# Patient Record
Sex: FEMALE | Race: WHITE | NOT HISPANIC OR LATINO | Employment: FULL TIME | URBAN - METROPOLITAN AREA
[De-identification: names, ages, dates, MRNs, and addresses within clinical notes are randomized per-mention and may not be internally consistent; named-entity substitution may affect disease eponyms.]

---

## 2017-01-30 ENCOUNTER — HOSPITAL ENCOUNTER (INPATIENT)
Facility: HOSPITAL | Age: 52
LOS: 7 days | Discharge: HOME | End: 2017-02-06
Attending: SPECIALIST | Admitting: SPECIALIST

## 2017-01-30 ENCOUNTER — HOSPITAL ENCOUNTER (EMERGENCY)
Facility: HOSPITAL | Age: 52
Discharge: PSYCHIATRIC OR SUBSTANCE USE FACILITY | End: 2017-01-30
Attending: EMERGENCY MEDICINE | Admitting: EMERGENCY MEDICINE

## 2017-01-30 VITALS
BODY MASS INDEX: 22.82 KG/M2 | WEIGHT: 124 LBS | SYSTOLIC BLOOD PRESSURE: 155 MMHG | HEIGHT: 62 IN | HEART RATE: 95 BPM | OXYGEN SATURATION: 100 % | RESPIRATION RATE: 18 BRPM | TEMPERATURE: 97.2 F | DIASTOLIC BLOOD PRESSURE: 100 MMHG

## 2017-01-30 DIAGNOSIS — F30.10 MANIC BEHAVIOR (HCC): Primary | ICD-10-CM

## 2017-01-30 DIAGNOSIS — Z86.59 HISTORY OF BIPOLAR DISORDER: ICD-10-CM

## 2017-01-30 PROBLEM — F45.41 STRESS HEADACHE: Status: ACTIVE | Noted: 2017-01-30

## 2017-01-30 PROBLEM — F31.9 BIPOLAR 1 DISORDER (HCC): Status: ACTIVE | Noted: 2017-01-30

## 2017-01-30 PROBLEM — R00.2 PALPITATIONS: Status: ACTIVE | Noted: 2017-01-30

## 2017-01-30 PROBLEM — F32.A DEPRESSION: Status: ACTIVE | Noted: 2017-01-30

## 2017-01-30 PROBLEM — G47.00 INSOMNIA: Status: ACTIVE | Noted: 2017-01-30

## 2017-01-30 LAB
ALBUMIN SERPL-MCNC: 4.7 G/DL (ref 3.5–5.2)
ALBUMIN/GLOB SERPL: 1.7 G/DL
ALP SERPL-CCNC: 47 U/L (ref 39–117)
ALT SERPL W P-5'-P-CCNC: 11 U/L (ref 1–33)
AMPHET+METHAMPHET UR QL: NEGATIVE
ANION GAP SERPL CALCULATED.3IONS-SCNC: 13.4 MMOL/L
APAP SERPL-MCNC: <5 MCG/ML (ref 10–30)
AST SERPL-CCNC: 17 U/L (ref 1–32)
BARBITURATES UR QL SCN: NEGATIVE
BASOPHILS # BLD AUTO: 0.01 10*3/MM3 (ref 0–0.2)
BASOPHILS NFR BLD AUTO: 0.2 % (ref 0–1.5)
BENZODIAZ UR QL SCN: POSITIVE
BILIRUB SERPL-MCNC: 0.3 MG/DL (ref 0.1–1.2)
BILIRUB UR QL STRIP: NEGATIVE
BUN BLD-MCNC: 10 MG/DL (ref 6–20)
BUN/CREAT SERPL: 15.9 (ref 7–25)
CALCIUM SPEC-SCNC: 9.5 MG/DL (ref 8.6–10.5)
CANNABINOIDS SERPL QL: NEGATIVE
CHLORIDE SERPL-SCNC: 102 MMOL/L (ref 98–107)
CLARITY UR: ABNORMAL
CO2 SERPL-SCNC: 25.6 MMOL/L (ref 22–29)
COCAINE UR QL: NEGATIVE
COLOR UR: YELLOW
CREAT BLD-MCNC: 0.63 MG/DL (ref 0.57–1)
DEPRECATED RDW RBC AUTO: 47.1 FL (ref 37–54)
EOSINOPHIL # BLD AUTO: 0.01 10*3/MM3 (ref 0–0.7)
EOSINOPHIL NFR BLD AUTO: 0.2 % (ref 0.3–6.2)
ERYTHROCYTE [DISTWIDTH] IN BLOOD BY AUTOMATED COUNT: 13.3 % (ref 11.7–13)
ETHANOL BLD-MCNC: <10 MG/DL (ref 0–10)
ETHANOL UR QL: <0.01 %
GFR SERPL CREATININE-BSD FRML MDRD: 99 ML/MIN/1.73
GLOBULIN UR ELPH-MCNC: 2.7 GM/DL
GLUCOSE BLD-MCNC: 116 MG/DL (ref 65–99)
GLUCOSE UR STRIP-MCNC: NEGATIVE MG/DL
HCT VFR BLD AUTO: 43.6 % (ref 35.6–45.5)
HGB BLD-MCNC: 14.7 G/DL (ref 11.9–15.5)
HGB UR QL STRIP.AUTO: NEGATIVE
HOLD SPECIMEN: NORMAL
HOLD SPECIMEN: NORMAL
IMM GRANULOCYTES # BLD: 0 10*3/MM3 (ref 0–0.03)
IMM GRANULOCYTES NFR BLD: 0 % (ref 0–0.5)
KETONES UR QL STRIP: NEGATIVE
LEUKOCYTE ESTERASE UR QL STRIP.AUTO: NEGATIVE
LYMPHOCYTES # BLD AUTO: 1.36 10*3/MM3 (ref 0.9–4.8)
LYMPHOCYTES NFR BLD AUTO: 22.8 % (ref 19.6–45.3)
MCH RBC QN AUTO: 32.9 PG (ref 26.9–32)
MCHC RBC AUTO-ENTMCNC: 33.7 G/DL (ref 32.4–36.3)
MCV RBC AUTO: 97.5 FL (ref 80.5–98.2)
METHADONE UR QL SCN: NEGATIVE
MONOCYTES # BLD AUTO: 0.27 10*3/MM3 (ref 0.2–1.2)
MONOCYTES NFR BLD AUTO: 4.5 % (ref 5–12)
NEUTROPHILS # BLD AUTO: 4.32 10*3/MM3 (ref 1.9–8.1)
NEUTROPHILS NFR BLD AUTO: 72.3 % (ref 42.7–76)
NITRITE UR QL STRIP: NEGATIVE
OPIATES UR QL: NEGATIVE
OXYCODONE UR QL SCN: NEGATIVE
PH UR STRIP.AUTO: 6.5 [PH] (ref 5–8)
PLATELET # BLD AUTO: 247 10*3/MM3 (ref 140–500)
PMV BLD AUTO: 9.5 FL (ref 6–12)
POTASSIUM BLD-SCNC: 3.6 MMOL/L (ref 3.5–5.2)
PROT SERPL-MCNC: 7.4 G/DL (ref 6–8.5)
PROT UR QL STRIP: NEGATIVE
RBC # BLD AUTO: 4.47 10*6/MM3 (ref 3.9–5.2)
SALICYLATES SERPL-MCNC: <0.3 MG/DL
SODIUM BLD-SCNC: 141 MMOL/L (ref 136–145)
SP GR UR STRIP: 1.02 (ref 1–1.03)
T4 SERPL-MCNC: 7.89 MCG/DL (ref 4.5–11.7)
TSH SERPL DL<=0.05 MIU/L-ACNC: 1.26 MIU/ML (ref 0.27–4.2)
UROBILINOGEN UR QL STRIP: ABNORMAL
WBC NRBC COR # BLD: 5.97 10*3/MM3 (ref 4.5–10.7)
WHOLE BLOOD HOLD SPECIMEN: NORMAL
WHOLE BLOOD HOLD SPECIMEN: NORMAL

## 2017-01-30 PROCEDURE — 93005 ELECTROCARDIOGRAM TRACING: CPT | Performed by: INTERNAL MEDICINE

## 2017-01-30 PROCEDURE — 93010 ELECTROCARDIOGRAM REPORT: CPT | Performed by: INTERNAL MEDICINE

## 2017-01-30 PROCEDURE — 81003 URINALYSIS AUTO W/O SCOPE: CPT | Performed by: SPECIALIST

## 2017-01-30 RX ORDER — CLONAZEPAM 0.5 MG/1
0.5 TABLET ORAL AS NEEDED
Status: DISCONTINUED | OUTPATIENT
Start: 2017-01-30 | End: 2017-02-06 | Stop reason: HOSPADM

## 2017-01-30 RX ORDER — BUPROPION HYDROCHLORIDE 100 MG/1
100 TABLET ORAL DAILY
Status: DISCONTINUED | OUTPATIENT
Start: 2017-01-30 | End: 2017-01-31

## 2017-01-30 RX ORDER — QUETIAPINE FUMARATE 50 MG/1
50 TABLET, FILM COATED ORAL NIGHTLY
COMMUNITY
End: 2017-02-06 | Stop reason: HOSPADM

## 2017-01-30 RX ORDER — MAGNESIUM HYDROXIDE/ALUMINUM HYDROXICE/SIMETHICONE 120; 1200; 1200 MG/30ML; MG/30ML; MG/30ML
30 SUSPENSION ORAL EVERY 6 HOURS PRN
Status: DISCONTINUED | OUTPATIENT
Start: 2017-01-30 | End: 2017-02-06 | Stop reason: HOSPADM

## 2017-01-30 RX ORDER — CLONAZEPAM 0.5 MG/1
0.5 TABLET ORAL NIGHTLY
Status: COMPLETED | OUTPATIENT
Start: 2017-01-30 | End: 2017-01-30

## 2017-01-30 RX ORDER — CLONAZEPAM 1 MG/1
1.5 TABLET ORAL NIGHTLY
COMMUNITY

## 2017-01-30 RX ORDER — TERBINAFINE HYDROCHLORIDE 250 MG/1
250 TABLET ORAL
Status: COMPLETED | OUTPATIENT
Start: 2017-01-30 | End: 2017-01-30

## 2017-01-30 RX ORDER — BUPROPION HYDROCHLORIDE 100 MG/1
200 TABLET ORAL DAILY
COMMUNITY
End: 2017-02-06 | Stop reason: HOSPADM

## 2017-01-30 RX ORDER — TERBINAFINE HYDROCHLORIDE 250 MG/1
250 TABLET ORAL DAILY
COMMUNITY

## 2017-01-30 RX ORDER — BUPROPION HYDROCHLORIDE 300 MG/1
300 TABLET ORAL DAILY
Status: CANCELLED | OUTPATIENT
Start: 2017-01-30 | End: 2017-02-01

## 2017-01-30 RX ORDER — LORAZEPAM 1 MG/1
1 TABLET ORAL EVERY 6 HOURS PRN
Status: DISCONTINUED | OUTPATIENT
Start: 2017-01-30 | End: 2017-01-30 | Stop reason: HOSPADM

## 2017-01-30 RX ORDER — QUETIAPINE FUMARATE 50 MG/1
50 TABLET, EXTENDED RELEASE ORAL NIGHTLY
Status: COMPLETED | OUTPATIENT
Start: 2017-01-30 | End: 2017-01-30

## 2017-01-30 RX ORDER — LAMOTRIGINE 100 MG/1
300 TABLET ORAL DAILY
COMMUNITY
End: 2017-02-06 | Stop reason: HOSPADM

## 2017-01-30 RX ORDER — ACETAMINOPHEN 325 MG/1
650 TABLET ORAL EVERY 6 HOURS PRN
Status: DISCONTINUED | OUTPATIENT
Start: 2017-01-30 | End: 2017-02-06 | Stop reason: HOSPADM

## 2017-01-30 RX ADMIN — LORAZEPAM 1 MG: 1 TABLET ORAL at 12:51

## 2017-01-30 RX ADMIN — TERBINAFINE 250 MG: 250 TABLET ORAL at 19:18

## 2017-01-30 RX ADMIN — ACETAMINOPHEN 650 MG: 325 TABLET ORAL at 18:15

## 2017-01-30 RX ADMIN — CLONAZEPAM 0.5 MG: 0.5 TABLET ORAL at 22:42

## 2017-01-30 RX ADMIN — CLONAZEPAM 0.5 MG: 0.5 TABLET ORAL at 17:27

## 2017-01-30 RX ADMIN — QUETIAPINE 50 MG: 50 TABLET, EXTENDED RELEASE ORAL at 22:41

## 2017-01-31 RX ORDER — TERBINAFINE HYDROCHLORIDE 250 MG/1
250 TABLET ORAL DAILY
Status: DISCONTINUED | OUTPATIENT
Start: 2017-01-31 | End: 2017-02-06 | Stop reason: HOSPADM

## 2017-01-31 RX ORDER — QUETIAPINE FUMARATE 50 MG/1
50 TABLET, FILM COATED ORAL NIGHTLY
Status: DISCONTINUED | OUTPATIENT
Start: 2017-01-31 | End: 2017-01-31

## 2017-01-31 RX ORDER — LAMOTRIGINE 100 MG/1
300 TABLET ORAL DAILY
Status: DISCONTINUED | OUTPATIENT
Start: 2017-01-31 | End: 2017-02-06 | Stop reason: HOSPADM

## 2017-01-31 RX ORDER — ZOLPIDEM TARTRATE 5 MG/1
5 TABLET ORAL NIGHTLY PRN
Status: DISCONTINUED | OUTPATIENT
Start: 2017-01-31 | End: 2017-02-06 | Stop reason: HOSPADM

## 2017-01-31 RX ORDER — LURASIDONE HYDROCHLORIDE 20 MG/1
20 TABLET, FILM COATED ORAL
Status: DISCONTINUED | OUTPATIENT
Start: 2017-01-31 | End: 2017-02-01

## 2017-01-31 RX ADMIN — LAMOTRIGINE 300 MG: 100 TABLET ORAL at 12:12

## 2017-01-31 RX ADMIN — LURASIDONE HYDROCHLORIDE 20 MG: 20 TABLET, FILM COATED ORAL at 18:48

## 2017-01-31 RX ADMIN — CLONAZEPAM 1.5 MG: 0.5 TABLET ORAL at 20:55

## 2017-01-31 RX ADMIN — ACETAMINOPHEN 650 MG: 325 TABLET ORAL at 14:33

## 2017-01-31 RX ADMIN — TERBINAFINE HYDROCHLORIDE 250 MG: 250 TABLET ORAL at 12:12

## 2017-02-01 PROCEDURE — 25010000002 HALOPERIDOL LACTATE PER 5 MG: Performed by: SPECIALIST

## 2017-02-01 PROCEDURE — 25010000002 DIPHENHYDRAMINE PER 50 MG: Performed by: SPECIALIST

## 2017-02-01 PROCEDURE — 25010000002 LORAZEPAM PER 2 MG: Performed by: SPECIALIST

## 2017-02-01 RX ORDER — OLANZAPINE 10 MG/1
10 TABLET, ORALLY DISINTEGRATING ORAL ONCE
Status: DISCONTINUED | OUTPATIENT
Start: 2017-02-01 | End: 2017-02-01

## 2017-02-01 RX ORDER — OLANZAPINE 7.5 MG/1
15 TABLET ORAL NIGHTLY
Status: DISCONTINUED | OUTPATIENT
Start: 2017-02-01 | End: 2017-02-06 | Stop reason: HOSPADM

## 2017-02-01 RX ORDER — HALOPERIDOL 5 MG/ML
10 INJECTION INTRAMUSCULAR ONCE
Status: COMPLETED | OUTPATIENT
Start: 2017-02-01 | End: 2017-02-01

## 2017-02-01 RX ORDER — LORAZEPAM 2 MG/ML
2 INJECTION INTRAMUSCULAR ONCE
Status: COMPLETED | OUTPATIENT
Start: 2017-02-01 | End: 2017-02-01

## 2017-02-01 RX ORDER — OLANZAPINE 10 MG/1
10 INJECTION, POWDER, LYOPHILIZED, FOR SOLUTION INTRAMUSCULAR ONCE
Status: DISCONTINUED | OUTPATIENT
Start: 2017-02-01 | End: 2017-02-06 | Stop reason: HOSPADM

## 2017-02-01 RX ORDER — NICOTINE 21 MG/24HR
1 PATCH, TRANSDERMAL 24 HOURS TRANSDERMAL EVERY 24 HOURS
Status: DISCONTINUED | OUTPATIENT
Start: 2017-02-01 | End: 2017-02-06 | Stop reason: HOSPADM

## 2017-02-01 RX ORDER — OLANZAPINE 5 MG/1
5 TABLET, ORALLY DISINTEGRATING ORAL ONCE
Status: COMPLETED | OUTPATIENT
Start: 2017-02-01 | End: 2017-02-01

## 2017-02-01 RX ORDER — DIPHENHYDRAMINE HYDROCHLORIDE 50 MG/ML
50 INJECTION INTRAMUSCULAR; INTRAVENOUS ONCE
Status: COMPLETED | OUTPATIENT
Start: 2017-02-01 | End: 2017-02-01

## 2017-02-01 RX ADMIN — TERBINAFINE HYDROCHLORIDE 250 MG: 250 TABLET ORAL at 08:02

## 2017-02-01 RX ADMIN — LAMOTRIGINE 300 MG: 100 TABLET ORAL at 08:02

## 2017-02-01 RX ADMIN — HALOPERIDOL LACTATE 10 MG: 5 INJECTION, SOLUTION INTRAMUSCULAR at 11:32

## 2017-02-01 RX ADMIN — CLONAZEPAM 0.5 MG: 0.5 TABLET ORAL at 10:42

## 2017-02-01 RX ADMIN — OLANZAPINE 15 MG: 7.5 TABLET, FILM COATED ORAL at 21:27

## 2017-02-01 RX ADMIN — OLANZAPINE 10 MG: 5 TABLET, ORALLY DISINTEGRATING ORAL at 09:50

## 2017-02-01 RX ADMIN — DIPHENHYDRAMINE HYDROCHLORIDE 50 MG: 50 INJECTION INTRAMUSCULAR; INTRAVENOUS at 11:32

## 2017-02-01 RX ADMIN — CLONAZEPAM 1.5 MG: 0.5 TABLET ORAL at 21:27

## 2017-02-01 RX ADMIN — LORAZEPAM 2 MG: 2 INJECTION, SOLUTION INTRAMUSCULAR; INTRAVENOUS at 11:33

## 2017-02-01 NOTE — PROGRESS NOTES
"Therapist met with pt's spouse outside of unit to notify him that family session has been canceled for this morning due to pt's inability to participate.  Spouse became tearful and insistent that he be allowed onto unit to visit with pt \"because I promised her I was coming at 11am.\"  This therapist as well as RN explained to pt that he can return to visit with pt during normal visiting hours, but pt is not currently stable enough to have a session.  Spouse became very tearful and irritated stating \"this isn't fair, I promised her.\"  Therapist sat with spouse and provided emotional support, discussed self care and encouraged spouse to reach out to family or friends for support at this time. Spouse states he will return for visitation this evening.   "

## 2017-02-01 NOTE — NURSING NOTE
Patient calm and resting in bed at this time. VS taken. All within adult suggested parameters as documented.

## 2017-02-01 NOTE — PLAN OF CARE
Problem:  Patient Care Overview (Adult)  Goal: Discharge Needs Assessment  Outcome: Ongoing (interventions implemented as appropriate)    02/01/17 1425   Discharge Needs Assessment   Concerns To Be Addressed mental health concerns;substance/tobacco abuse/use concerns   Current Discharge Risk history of non-[facility];psychiatric illness   Discharge Planning Comments Pt current with Dr. Garcia. ANDIE consult pending. SW to encourage pt to follow up with therapy upon discharge.    Discharge Needs Assessment   Outpatient/Agency/Support Group Needs outpatient counseling;outpatient psychiatric care (specify)   Anticipated Discharge Disposition home with family;home or self-care

## 2017-02-01 NOTE — NURSING NOTE
Pt sitting at chair in hallways and became tearful. Offered reassurance and active listening. Pt stated that she wanted her PRN medication for anxiety at this time. Medication retrieved. When this nurse came back to administer the medication, the pt had been assisted to her room and was lying in the bed screaming and crying histarically. She was putting her face in the pillows and not responding to questions or directions. Eventually she sat up and took her PRN medication. Reassurance offered, curtains opened and pt encouragged to express thoughts/feelings. She continued crying but turned as if to look out the window.

## 2017-02-01 NOTE — PLAN OF CARE
"Problem:  Patient Care Overview (Adult)  Goal: Plan of Care Review  Outcome: Ongoing (interventions implemented as appropriate)    02/01/17 1022 02/01/17 1500   Patient Care Overview   Progress --  declining   Coping/Psychosocial Response Interventions   Plan Of Care Reviewed With --  patient   Coping/Psychosocial   Patient Agreement with Plan of Care other (see comments)  (repetative questioning. Acts confused, states, \"I understand) --    Outcome Evaluation   Outcome Summary/Follow up Plan --  Patients behavior is declining. Went form paranoia to karen this shift. Required additional medicatins and medicaiton adjustments.          Problem:  Overarching Goals  Goal: Adheres to Safety Considerations for Self and Others  Outcome: Ongoing (interventions implemented as appropriate)    02/01/17 1500   Overarching Goals   Adheres to Safety Considerations patinet unable to comprhend safety measures at this time due to manic state       Intervention: Develop/maintain Individualized Safety Plan    01/31/17 1800 02/01/17 1022   Safety   Safety Measures safety rounds completed --    Provide Emotional/Physical Safety   Suicidal Ideation --  no   Willingness to Contact Staff Member if Feeling Like Hurting Self --  yes   Mental Health Homicide Risk   Homicidal Ideation --  no   Willingness to Contact Staff Member if Feeling Like Hurting Others --  yes         Goal: Optimized Coping Skills in Response to Life Stressors  Outcome: Ongoing (interventions implemented as appropriate)    02/01/17 1500   Overarching Goals   Optimized Coping Skills patient was unable to utilize effective coping skills today due to manic behaviors.        Intervention: Promote Effective Coping Strategies    02/01/17 1022   Coping Strategies   Supportive Measures active listening utilized;journaling promoted;problem solving facilitated;self-care encouraged;self-reflection promoted;self-responsibility promoted;verbalization of feelings encouraged     "     Goal: Develops/Participates in Therapeutic [place] to Support Successful Transition  Outcome: Ongoing (interventions implemented as appropriate)    02/01/17 1500   Patient Care Overview   Develop/Participate Therapeutic [place] Patient was unable to participate in support groups today due to manic state.        Intervention: Foster Therapeutic [place]    02/01/17 1022   Coping Strategies   Trust Relationship/Rapport care explained;choices provided;emotional support provided;empathic listening provided;questions answered;questions encouraged;reassurance provided;thoughts/feelings acknowledged       Intervention: Mutually Develop Transition Plan    02/01/17 1022   OTHER   Transition Support crisis management plan promoted

## 2017-02-01 NOTE — NURSING NOTE
Doctor here to see patient with new orders to give a one time dose of 10 mg disintegrating Zyprexa. Medication given with explanation for use. Patient accepted the medication after several explanations. Assessed again for anxiety and offered PRN medication but pt declined.

## 2017-02-01 NOTE — PLAN OF CARE
Problem:  Patient Care Overview (Adult)  Goal: Plan of Care Review  Outcome: Ongoing (interventions implemented as appropriate)    01/31/17 2329 02/01/17 0329   Patient Care Overview   Progress --  no change   Coping/Psychosocial Response Interventions   Plan Of Care Reviewed With patient --    Coping/Psychosocial   Patient Agreement with Plan of Care agrees --    Outcome Evaluation   Outcome Summary/Follow up Plan --  Pt has been compliant with medications this shift. She rates anxiety 7/10, depression 5/10, denies pain, SI, HI and hallucinations. Will continue to monitor.         Problem:  Overarching Goals  Goal: Adheres to Safety Considerations for Self and Others  Outcome: Ongoing (interventions implemented as appropriate)  Goal: Optimized Coping Skills in Response to Life Stressors  Outcome: Ongoing (interventions implemented as appropriate)  Goal: Develops/Participates in Therapeutic [place] to Support Successful Transition  Outcome: Ongoing (interventions implemented as appropriate)

## 2017-02-01 NOTE — NURSING NOTE
"Pt with paranoid behavior this morning. Consistently asking repetitive questions an seeking out staff members to get clarifications on medications. Rolling pills in hands, looking at them for wiliam periods and asking what they are and what they are for. After explanation given, she would stare blankly and then ask the same questions again as if she did not comprehend the explanation. Was eventually med compliant after numerous explanations. Pt going to several staff members with frequent statements such as, \"I have a request, that should not be too much to ask\" but then when asked what her request was, she would repeat the same response or offer an explanation of something rather than a request or question. Pt wandering halls frequently. Refused to go to group but then after looking at the calender stated that she wanted to attend; however, she then would walk away to someone else to ask a question and not go. Patient stated that she had anxiety but  Refused her PRN medication when offered.  "

## 2017-02-01 NOTE — PROGRESS NOTES
Cycling into karen -- irritable and paranoid today    Required prn dose of OLZ this AM due to agitation    Will DC LUR and begin aggressively dosed OLZ

## 2017-02-01 NOTE — NURSING NOTE
"Pt continued to scream and cry uncontrollably. She got up several times to walk but as she did, appeared to be bending here knees with an  Unsteady gait. She was not responding to staff when offered instruction. Assisted to bed. Continued screaming and putting hands in hair, stating, \"what the fuck is going on here\" but when attempted to talk to pt, she would just scream and cry. Dr. SANDERSON notified of pt state and gave new orders to administer 10 mg IM Haldol plus 2 mg IM Ativan plus 50 mg IM Benadryl now. Medication retrieved and patient willingly accepted the injections after explanation.   "

## 2017-02-02 RX ADMIN — OLANZAPINE 15 MG: 7.5 TABLET, FILM COATED ORAL at 21:14

## 2017-02-02 RX ADMIN — CLONAZEPAM 1.5 MG: 0.5 TABLET ORAL at 21:14

## 2017-02-02 RX ADMIN — LAMOTRIGINE 300 MG: 100 TABLET ORAL at 08:18

## 2017-02-02 RX ADMIN — TERBINAFINE HYDROCHLORIDE 250 MG: 250 TABLET ORAL at 08:18

## 2017-02-02 NOTE — PLAN OF CARE
Problem: BH Patient Care Overview (Adult)  Goal: Plan of Care Review  Outcome: Ongoing (interventions implemented as appropriate)    02/02/17 1612   Patient Care Overview   Progress improving   Coping/Psychosocial Response Interventions   Plan Of Care Reviewed With spouse   Coping/Psychosocial   Patient Agreement with Plan of Care agrees   Outcome Evaluation   Outcome Summary/Follow up Plan SW received 2 phone calls from pt's  today re: FMLA paperwork for pt's employer. SW provided 2 fax numbers for paperwork to be faxed to and SW did not receive paperwork. SW called and left message for pt's employer requesting the paperwork and leaving contact information if needed.  requested another family session since pt was not appropriate for a session yesterday. SW recommended session for the weekend or possibly Monday. ADDISON will relay this info to therapists who schedule.

## 2017-02-02 NOTE — PLAN OF CARE
Problem: BH Patient Care Overview (Adult)  Goal: Plan of Care Review  Outcome: Ongoing (interventions implemented as appropriate)    02/02/17 1516   Patient Care Overview   Progress improving   Coping/Psychosocial Response Interventions   Plan Of Care Reviewed With patient   Coping/Psychosocial   Patient Agreement with Plan of Care agrees         Problem:  Overarching Goals  Goal: Adheres to Safety Considerations for Self and Others  Outcome: Ongoing (interventions implemented as appropriate)    02/02/17 1516   Overarching Goals   Adheres to Safety Considerations Safety risk factors identified, safety concerns discussed, protective physical environment provided, safety and environmental rounds provided as appropriate       Goal: Optimized Coping Skills in Response to Life Stressors  Outcome: Ongoing (interventions implemented as appropriate)    02/02/17 1516   Overarching Goals   Optimized Coping Skills Coping strategies identified, assisted with using positive coping strategies, healthy lifestyle activities encouraged       Goal: Develops/Participates in Therapeutic [place] to Support Successful Transition  Outcome: Ongoing (interventions implemented as appropriate)    02/02/17 1516   Patient Care Overview   Develop/Participate Therapeutic [place] Rapport established, trust relationship developed, emotional support provided, confidentiality honored

## 2017-02-02 NOTE — PROGRESS NOTES
"Groggy this AM (may need to consider reduction in OLZ dose), but greatly improved from yesterday    continuing aggressive pharmacotx -- suspect we may not yet be \"out of the woods\" re manic syx  "

## 2017-02-02 NOTE — PLAN OF CARE
"Problem:  Patient Care Overview (Adult)  Goal: Plan of Care Review  Outcome: Ongoing (interventions implemented as appropriate)    02/01/17 2132 02/02/17 0337   Coping/Psychosocial Response Interventions   Plan Of Care Reviewed With patient --    Coping/Psychosocial   Patient Agreement with Plan of Care agrees --    Outcome Evaluation   Outcome Summary/Follow up Plan --  Pt remained in room this shift sleeping, up once to the bathroom and to take medications. Pt stated \"Im just so tired.\" Pt denied all issues, denied thoughts to hurt self or others. Will continue to monitor changes in mood and behaviors and provide feedback and support         Problem:  Overarching Goals  Goal: Adheres to Safety Considerations for Self and Others  Intervention: Develop/maintain Individualized Safety Plan    02/01/17 2132   Safety   Safety Measures safety rounds completed;suicide assessment completed   Provide Emotional/Physical Safety   Suicidal Ideation no   Willingness to Contact Staff Member if Feeling Like Hurting Self yes   Mental Health Homicide Risk   Homicidal Ideation no   Willingness to Contact Staff Member if Feeling Like Hurting Others yes         Goal: Optimized Coping Skills in Response to Life Stressors  Intervention: Promote Effective Coping Strategies    02/01/17 2132   Coping Strategies   Supportive Measures active listening utilized;relaxation techniques promoted;self-care encouraged;self-reflection promoted;self-responsibility promoted;verbalization of feelings encouraged         Goal: Develops/Participates in Therapeutic [place] to Support Successful Transition  Intervention: Foster Therapeutic [place]    02/01/17 2132   Coping Strategies   Trust Relationship/Rapport choices provided;care explained;emotional support provided;empathic listening provided;questions answered;questions encouraged;reassurance provided;thoughts/feelings acknowledged       Intervention: Mutually Develop Transition Plan    02/01/17 " [id number]   OTHER   Transition Support crisis management plan promoted

## 2017-02-03 RX ADMIN — CLONAZEPAM 0.5 MG: 0.5 TABLET ORAL at 09:13

## 2017-02-03 RX ADMIN — TERBINAFINE HYDROCHLORIDE 250 MG: 250 TABLET ORAL at 09:11

## 2017-02-03 RX ADMIN — OLANZAPINE 15 MG: 7.5 TABLET, FILM COATED ORAL at 22:00

## 2017-02-03 RX ADMIN — CLONAZEPAM 1.5 MG: 0.5 TABLET ORAL at 22:00

## 2017-02-03 RX ADMIN — LAMOTRIGINE 300 MG: 100 TABLET ORAL at 09:08

## 2017-02-03 NOTE — PLAN OF CARE
Problem:  Patient Care Overview (Adult)  Goal: Plan of Care Review  Outcome: Ongoing (interventions implemented as appropriate)    02/03/17 1836   Patient Care Overview   Progress improving   Coping/Psychosocial Response Interventions   Plan Of Care Reviewed With patient   Coping/Psychosocial   Patient Agreement with Plan of Care agrees         Problem:  Overarching Goals  Goal: Adheres to Safety Considerations for Self and Others  Outcome: Ongoing (interventions implemented as appropriate)    02/03/17 1836   Overarching Goals   Adheres to Safety Considerations Discussed safety concerns. Safe environment provided.       Intervention: Develop/maintain Individualized Safety Plan    02/03/17 0900 02/03/17 1700   Safety   Safety Measures --  safety rounds completed   Provide Emotional/Physical Safety   Suicidal Ideation no --    Willingness to Contact Staff Member if Feeling Like Hurting Self yes --    Mental Health Homicide Risk   Homicidal Ideation no --    Willingness to Contact Staff Member if Feeling Like Hurting Others yes --          Goal: Optimized Coping Skills in Response to Life Stressors  Outcome: Ongoing (interventions implemented as appropriate)    02/03/17 1836   Overarching Goals   Optimized Coping Skills Discussed utilizing coping skills appropriately.       Intervention: Promote Effective Coping Strategies    02/03/17 0900   Coping Strategies   Supportive Measures active listening utilized;positive reinforcement provided;relaxation techniques promoted;self-care encouraged;verbalization of feelings encouraged         Goal: Develops/Participates in Therapeutic [place] to Support Successful Transition  Outcome: Ongoing (interventions implemented as appropriate)    02/03/17 1836   Patient Care Overview   Develop/Participate Therapeutic [place] Established rapport, provided emotional support. Pt. participating in programing.       Intervention: Foster Therapeutic [place]    02/03/17 0900   Coping  Strategies   Trust Relationship/Rapport care explained;choices provided;emotional support provided;empathic listening provided;questions answered;reassurance provided;thoughts/feelings acknowledged       Intervention: Mutually Develop Transition Plan    02/03/17 0900   OTHER   Transition Support crisis management plan promoted

## 2017-02-03 NOTE — CONSULTS
Met with pt for consult. Pt reports drinking one drink alcohol 1x monthly. Pt reported that she is prescribed Klonopin and that she had been taking extra half pill to deal with work related stress. Pt reported that her psychiatrist recommended this. Pt educated on risk factors for severity of substance use disorder. We explored possibility of psych IOP and pt seemed open to that option for aftercare.

## 2017-02-03 NOTE — PLAN OF CARE
Problem: BH Patient Care Overview (Adult)  Goal: Plan of Care Review  Outcome: Ongoing (interventions implemented as appropriate)    02/03/17 0573   Patient Care Overview   Progress improving   Coping/Psychosocial Response Interventions   Plan Of Care Reviewed With patient   Coping/Psychosocial   Patient Agreement with Plan of Care agrees   Outcome Evaluation   Outcome Summary/Follow up Plan sleeping well, no complaints, family session pending, continue current tx and monitor closely         Problem: Anxiety (Adult)  Goal: Identify Related Risk Factors and Signs and Symptoms  Outcome: Ongoing (interventions implemented as appropriate)

## 2017-02-03 NOTE — PROGRESS NOTES
Still w some disorganization of thought, but did well in groups yesterday    Spoke w  re tx    Seems to be tolerating OLZ well

## 2017-02-03 NOTE — PLAN OF CARE
Problem:  Patient Care Overview (Adult)  Goal: Plan of Care Review  Outcome: Ongoing (interventions implemented as appropriate)    02/03/17 1536   Patient Care Overview   Progress improving   Coping/Psychosocial Response Interventions   Plan Of Care Reviewed With patient   Coping/Psychosocial   Patient Agreement with Plan of Care agrees   Outcome Evaluation   Outcome Summary/Follow up Plan SW met with pt to discuss discharge plans. Pt demonstrated some thought blocking but was able to stay on task to discuss follow up. Pt current with Dr. Garcia and a therapist Flaco at [facility]. Pt is open to discussing step down programming as dc option. SW explained that FMLA paperwork, completed, and returned back to pt's supervisor Martínez Ritchie. Pt looking forward to marital session on Monday. SW will continue to follow.

## 2017-02-04 RX ADMIN — LAMOTRIGINE 300 MG: 100 TABLET ORAL at 09:32

## 2017-02-04 RX ADMIN — TERBINAFINE HYDROCHLORIDE 250 MG: 250 TABLET ORAL at 09:32

## 2017-02-04 RX ADMIN — OLANZAPINE 15 MG: 7.5 TABLET, FILM COATED ORAL at 21:38

## 2017-02-04 RX ADMIN — CLONAZEPAM 1.5 MG: 0.5 TABLET ORAL at 21:38

## 2017-02-04 NOTE — PROGRESS NOTES
Improvement continues, tolerating meds well    Marital session scheduled for Mon -- expect DC thereafter

## 2017-02-04 NOTE — PLAN OF CARE
Problem:  Patient Care Overview (Adult)  Goal: Plan of Care Review  Outcome: Ongoing (interventions implemented as appropriate)    02/04/17 0430   Patient Care Overview   Progress improving   Coping/Psychosocial Response Interventions   Plan Of Care Reviewed With patient   Coping/Psychosocial   Patient Agreement with Plan of Care agrees   Outcome Evaluation   Outcome Summary/Follow up Plan Pt is pleasant on assessment. Reports intermittent anxiety, but none at current time. She denies hallucinations, SI, HI. She tracks conversation well, with only mild distractability. Pt in agreement w/ current plan of care.        Goal: Individualization and Mutuality    02/04/17 0430   Behavioral Health Screens   Patient Personal Strengths positive attitude;motivated for recovery;motivated for treatment;intellectual cognitive skills;family/social support;independent living skills;positive educational history;positive vocational history         Problem:  Overarching Goals  Goal: Adheres to Safety Considerations for Self and Others  Intervention: Develop/maintain Individualized Safety Plan    02/04/17 0430   Safety   Safety Measures safety rounds completed;suicide check-in completed   Provide Emotional/Physical Safety   Suicidal Ideation no   Willingness to Contact Staff Member if Feeling Like Hurting Self yes   Mental Health Homicide Risk   Homicidal Ideation no   Willingness to Contact Staff Member if Feeling Like Hurting Others yes         Goal: Optimized Coping Skills in Response to Life Stressors  Intervention: Promote Effective Coping Strategies    02/04/17 0430   Coping Strategies   Supportive Measures active listening utilized;self-care encouraged;verbalization of feelings encouraged

## 2017-02-05 RX ADMIN — CLONAZEPAM 1.5 MG: 0.5 TABLET ORAL at 21:44

## 2017-02-05 RX ADMIN — LAMOTRIGINE 300 MG: 100 TABLET ORAL at 08:40

## 2017-02-05 RX ADMIN — TERBINAFINE HYDROCHLORIDE 250 MG: 250 TABLET ORAL at 08:40

## 2017-02-05 RX ADMIN — OLANZAPINE 15 MG: 7.5 TABLET, FILM COATED ORAL at 21:44

## 2017-02-05 NOTE — PLAN OF CARE
Problem:  Patient Care Overview (Adult)  Goal: Plan of Care Review  Outcome: Ongoing (interventions implemented as appropriate)    02/04/17 2333 02/05/17 0413   Patient Care Overview   Progress --  improving   Coping/Psychosocial Response Interventions   Plan Of Care Reviewed With patient --    Coping/Psychosocial   Patient Agreement with Plan of Care agrees --    Outcome Evaluation   Outcome Summary/Follow up Plan --  pt has been pleasant and cooperative this shift. She rates her anxiety 3/10, depression 6/10. She denies SI,HI, and pain. will continue to monitor.         Problem:  Overarching Goals  Goal: Adheres to Safety Considerations for Self and Others  Outcome: Ongoing (interventions implemented as appropriate)  Goal: Optimized Coping Skills in Response to Life Stressors  Outcome: Ongoing (interventions implemented as appropriate)  Goal: Develops/Participates in Therapeutic [place] to Support Successful Transition  Outcome: Ongoing (interventions implemented as appropriate)

## 2017-02-05 NOTE — PLAN OF CARE
Problem:  Patient Care Overview (Adult)  Goal: Plan of Care Review  Outcome: Ongoing (interventions implemented as appropriate)    02/05/17 1156 02/05/17 1420   Coping/Psychosocial Response Interventions   Plan Of Care Reviewed With patient --    Coping/Psychosocial   Patient Agreement with Plan of Care agrees;other (see comments)  (states this unit has brought her peace) --    Outcome Evaluation   Outcome Summary/Follow up Plan --  The patient states she is glad with the results she obained by coming in. She states things went south fast prior to admission, but she has learned new positive coping skills that have helped her. She states the groups are great, and meditation and mindfullness were the most useful to her. She is motivated and future oriented, and states her stay has helped her  communicate better with her, and helped him get a better understanding of her diagnosis. She is looking forward to discharge Monday, and states she has a therapist she has been working with for 2 years on guilt, and her fear of the unknown. She plans to return to that therapist.         Problem:  Overarching Goals  Goal: Develops/Participates in Therapeutic [place] to Support Successful Transition  Intervention: Foster Therapeutic [place]    02/05/17 1156   Coping Strategies   Trust Relationship/Rapport care explained;choices provided;emotional support provided;empathic listening provided;questions answered;questions encouraged;reassurance provided;thoughts/feelings acknowledged       Intervention: Mutually Develop Transition Plan    02/05/17 1156   OTHER   Transition Support follow up care discussed

## 2017-02-05 NOTE — PLAN OF CARE
Problem:  Patient Care Overview (Adult)  Goal: Plan of Care Review  Outcome: Ongoing (interventions implemented as appropriate)    02/04/17 0430 02/04/17 1644   Patient Care Overview   Progress improving --    Coping/Psychosocial Response Interventions        02/04/17 0430 02/04/17 1644   Patient Care Overview   Progress improving --    Coping/Psychosocial Response Interventions   Plan Of Care Reviewed With --  patient   Coping/Psychosocial   Patient Agreement with Plan of Care --  agrees   Outcome Evaluation   Outcome Summary/Follow up Plan Pt is pleasant on assessment. Reports intermittent anxiety, but none at current time. She denies hallucinations, SI, HI. She tracks conversation well, with only mild distractability. Pt in agreement w/ current plan of care.  --             02/04/17 1644 02/04/17 1953   Patient Care Overview   Progress --  improving   Coping/Psychosocial Response Interventions   Plan Of Care Reviewed With patient --    Coping/Psychosocial   Patient Agreement with Plan of Care agrees --    Outcome Evaluation   Outcome Summary/Follow up Plan --  Pt is pleasant and cooperative. isolating less and interacting appropriately with staff and peers. Denies SI/HI     --  patient         --  agrees         Pt is pleasant on assessment. Reports intermittent anxiety, but none at current time. She denies hallucinations, SI, HI. She tracks conversation well, with only mild distractability. Pt in agreement w/ current plan of care.  --          Problem:  Overarching Goals  Goal: Adheres to Safety Considerations for Self and Others  Outcome: Ongoing (interventions implemented as appropriate)    02/03/17 1836   Overarching Goals   Adheres to Safety Considerations Discussed safety concerns. Safe environment provided.       Intervention: Develop/maintain Individualized Safety Plan    02/04/17 1644 02/04/17 1800   Safety   Safety Measures --  safety rounds completed   Provide Emotional/Physical Safety   Suicidal  Ideation no --    Willingness to Contact Staff Member if Feeling Like Hurting Self yes --    Mental Health Homicide Risk   Homicidal Ideation no --    Willingness to Contact Staff Member if Feeling Like Hurting Others yes --          Goal: Optimized Coping Skills in Response to Life Stressors  Outcome: Ongoing (interventions implemented as appropriate)    02/03/17 1836   Overarching Goals   Optimized Coping Skills Discussed utilizing coping skills appropriately.       Intervention: Promote Effective Coping Strategies    02/04/17 1644   Coping Strategies   Supportive Measures active listening utilized;positive reinforcement provided;relaxation techniques promoted;self-care encouraged         Goal: Develops/Participates in Therapeutic [place] to Support Successful Transition  Outcome: Ongoing (interventions implemented as appropriate)    02/03/17 1836   Patient Care Overview   Develop/Participate Therapeutic [place] Established rapport, provided emotional support. Pt. participating in programing.       Intervention: Foster Therapeutic [place]    02/04/17 1644   Coping Strategies   Trust Relationship/Rapport care explained;emotional support provided;questions answered;reassurance provided;other (see comments)  (Helped pt gather supplies to make a peer a birthday card)       Intervention: Mutually Develop Transition Plan    02/04/17 1644   OTHER   Transition Support crisis management plan promoted           Problem: Depression  Goal: Treatment Goal: Demonstrate behavioral control of depressive symptoms, verbalize feelings of improved mood/affect, and adopt new coping skills prior to discharge  Outcome: Ongoing (interventions implemented as appropriate)  Goal: Verbalize thoughts and feelings associated with:  Outcome: Ongoing (interventions implemented as appropriate)  Goal: Refrain from harming self  Outcome: Ongoing (interventions implemented as appropriate)  Goal: Refrain from isolation  Outcome: Ongoing  (interventions implemented as appropriate)  Goal: Refrain from self-neglect  Outcome: Ongoing (interventions implemented as appropriate)  Goal: Attend and participate in unit activities, including therapeutic, recreational, and educational groups  Outcome: Ongoing (interventions implemented as appropriate)  Goal: Complete daily ADLs, including personal hygiene independently, as able  Outcome: Ongoing (interventions implemented as appropriate)  Goal: Treatment Goal: Demonstrate behavioral control of depressive symptoms, verbalize feelings of improved mood/affect, and adopt new coping skills prior to discharge  Outcome: Ongoing (interventions implemented as appropriate)  Goal: Verbalize thoughts and feelings associated with:  Outcome: Ongoing (interventions implemented as appropriate)  Goal: Refrain from harming self  Outcome: Ongoing (interventions implemented as appropriate)  Goal: Refrain from isolation  Outcome: Ongoing (interventions implemented as appropriate)  Goal: Refrain from self-neglect  Outcome: Ongoing (interventions implemented as appropriate)  Goal: Attend and participate in unit activities, including therapeutic, recreational, and educational groups  Outcome: Ongoing (interventions implemented as appropriate)  Goal: Complete daily ADLs, including personal hygiene independently, as able  Outcome: Ongoing (interventions implemented as appropriate)    Problem: Alteration in Thoughts and Perception  Goal: Treatment Goal: Gain control of psychotic behaviors/thinking, reduce/eliminate presenting symptoms and demonstrate improved reality functioning upon discharge  Outcome: Ongoing (interventions implemented as appropriate)  Goal: Refrain from acting on delusional thinking/internal stimuli  Outcome: Ongoing (interventions implemented as appropriate)  Goal: Agree to be compliant with medication regime, as prescribed and report medication side effects  Outcome: Ongoing (interventions implemented as  appropriate)  Goal: Attend and participate in unit activities, including therapeutic, recreational, and educational groups  Outcome: Ongoing (interventions implemented as appropriate)  Goal: Recognize dysfunctional thoughts, communicate reality-based thoughts at the time of discharge  Outcome: Ongoing (interventions implemented as appropriate)  Goal: Complete daily ADLs, including personal hygiene independently, as able  Outcome: Ongoing (interventions implemented as appropriate)    Problem: Anxiety (Adult)  Intervention: Promote Anxiety Reduction/Resolution    02/04/17 1644   Coping Strategies   Supportive Measures active listening utilized;positive reinforcement provided;relaxation techniques promoted;self-care encouraged   Family/Support System Care involvement promoted;other (see comments)   Coping/Psychosocial Interventions   Environmental Support calm environment promoted;environmental consistency promoted   Cognitive Interventions   Sensory Stimulation Regulation auditory stimulation minimized;quiet environment promoted         Goal: Identify Related Risk Factors and Signs and Symptoms  Outcome: Ongoing (interventions implemented as appropriate)    01/31/17 0943   Anxiety   Related Risk Factors (Anxiety) substance use/abuse   Signs and Symptoms (Anxiety) apprehension/being worried       Goal: Reduction/Resolution  Outcome: Ongoing (interventions implemented as appropriate)    02/04/17 1953   Anxiety (Adult)   Reduction/Resolution making progress toward outcome

## 2017-02-06 VITALS
HEART RATE: 69 BPM | OXYGEN SATURATION: 97 % | WEIGHT: 129.6 LBS | RESPIRATION RATE: 18 BRPM | SYSTOLIC BLOOD PRESSURE: 96 MMHG | DIASTOLIC BLOOD PRESSURE: 56 MMHG | BODY MASS INDEX: 23.7 KG/M2 | TEMPERATURE: 98 F

## 2017-02-06 RX ORDER — ZOLPIDEM TARTRATE 5 MG/1
5 TABLET ORAL NIGHTLY PRN
Qty: 30 TABLET | Refills: 0 | Status: SHIPPED | OUTPATIENT
Start: 2017-02-06 | End: 2017-02-10

## 2017-02-06 RX ORDER — CLONAZEPAM 0.5 MG/1
0.5 TABLET ORAL AS NEEDED
Qty: 75 TABLET | Refills: 0 | Status: SHIPPED | OUTPATIENT
Start: 2017-02-06 | End: 2017-02-09

## 2017-02-06 RX ORDER — LAMOTRIGINE 150 MG/1
150 TABLET ORAL DAILY
Qty: 69 TABLET | Refills: 1 | Status: SHIPPED | OUTPATIENT
Start: 2017-02-06

## 2017-02-06 RX ORDER — OLANZAPINE 15 MG/1
15 TABLET ORAL NIGHTLY
Qty: 30 TABLET | Refills: 1 | Status: SHIPPED | OUTPATIENT
Start: 2017-02-06

## 2017-02-06 RX ADMIN — TERBINAFINE HYDROCHLORIDE 250 MG: 250 TABLET ORAL at 08:09

## 2017-02-06 RX ADMIN — LAMOTRIGINE 300 MG: 100 TABLET ORAL at 08:09

## 2017-02-06 NOTE — PLAN OF CARE
Problem:  Patient Care Overview (Adult)  Goal: Discharge Needs Assessment    02/01/17 1535 02/06/17 1106   Discharge Needs Assessment   Concerns To Be Addressed mental health concerns;substance/tobacco abuse/use concerns --    Current Discharge Risk history of non-[facility];psychiatric illness --    Discharge Planning Comments --  follow up with Dr. Garcia, [address] [facility] [address], [phone], 03/06/17 at 10:15am and Flaco (therapist) 02/09/17 at 6:15pm   Discharge Needs Assessment   Outpatient/Agency/Support Group Needs outpatient counseling;outpatient psychiatric care (specify) --    Anticipated Discharge Disposition home with family;home or self-care --

## 2017-02-06 NOTE — PSYCH
PSYCHIATRIC DISCHARGE SUMMARY     DATE OF ADMISSION:  01/30/2017   DATE OF DISCHARGE:  02/06/2017     PRIMARY CARE PHYSICIAN:  Tanya Sanchez MD    HISTORY OF PRESENT ILLNESS: The patient is a 51-year-old white female admitted in a mixed phase of bipolar disorder.     HOSPITAL COURSE: The patient was admitted to the [facility] and placed on suicide precautions. She was continued on previously prescribed lamotrigine and was begun on aggressively dosed Zyprexa as it did appear shortly after her hospitalization that she was cycling into karen. She did require 1 time p.r.n. dose of Haldol, Benadryl, and Ativan and afterwards had calmed considerably. She did continue to have some difficulty processing and thought blocking, but these symptoms had essentially resolved by the time of discharge. By 02/06/2017, a positive family session had taken place and discharge was ordered.     FINAL DIAGNOSIS: Bipolar disorder, most recent episode mixed.      DISCHARGE MEDICATIONS:  1.  Klonopin 0.5 mg q.a.m. p.r.n. anxiety, 1.5 mg at bedtime p.r.n. insomnia.   2.  Lamotrigine 300 mg nightly for mood stabilization.   3.  Olanzapine 15 mg at nighttime for mood stabilization.   4.  Lamisil 250 mg daily for toenail fungus.   5.  Zolpidem 5 mg at bedtime p.r.n. insomnia.     DIET AND ACTIVITY: No dietary or physical restrictions placed on the patient at the time of discharge.     FOLLOWUP:  She will follow up with Dr. Felipa Garcia.     PROGNOSIS: Her prognosis is considered good.        ALINE Cronin:veronika  D:   02/06/2017 09:28:47  T:   02/06/2017 09:56:16  Job ID:   [id number]  Document ID:   [id number]  cc:   Tanya Sanchez M.D.

## 2017-02-06 NOTE — PLAN OF CARE
Problem:  Patient Care Overview (Adult)  Goal: Plan of Care Review  Outcome: Ongoing (interventions implemented as appropriate)    02/06/17 0141 02/06/17 0530   Patient Care Overview   Progress --  improving   Coping/Psychosocial Response Interventions   Plan Of Care Reviewed With patient --    Coping/Psychosocial   Patient Agreement with Plan of Care agrees --    Outcome Evaluation   Outcome Summary/Follow up Plan --  Pt has been pleasant and cooperative this shift. She rates anxiety 5/10, depression 3/10 wth no complaints of pain. Denies SI and HI, will continue to monitor.         Problem:  Overarching Goals  Goal: Adheres to Safety Considerations for Self and Others  Outcome: Ongoing (interventions implemented as appropriate)  Goal: Optimized Coping Skills in Response to Life Stressors  Outcome: Ongoing (interventions implemented as appropriate)  Goal: Develops/Participates in Therapeutic [place] to Support Successful Transition  Outcome: Ongoing (interventions implemented as appropriate)

## 2017-02-06 NOTE — SIGNIFICANT NOTE
Met with pt and her  for a marital session.  Pt and spouse were bright and eager to see each other, both stated they are eager to leave today.  Therapist guided session around processing what triggered pt's manic episode and plan for aftercare.  Pt showed good insight into her bipolar illness and appears to have developed a good therapeutic relationship with her psychiatrist over the past several years.  Pt and spouse were able to identify several stressors in pt's life as well as pt's sensitivity to change in seasons as potential triggers.  Pt states she learned several new self care skills during her admission that she plans to utilize at home including meditation and breathing exercises.  Additionally, encouraged pt to discuss use of light therapy with her therapist as a possible addition to her treatment plan.  Pt and spouse report they are supportive of one another and feel they have good communication.  Pt highlighted some differences in their personalities that have caused some communication barriers and asked her spouse if he would accompany her to therapy in the next month.  Spouse was agreeable to couples counseling.  Plan for aftercare is for pt to f/u with her psychiatrist, continue new medication regimen and begin therapy.

## 2017-02-07 ENCOUNTER — TELEPHONE (OUTPATIENT)
Dept: PSYCHIATRY | Facility: HOSPITAL | Age: 52
End: 2017-02-07

## 2017-02-07 NOTE — TELEPHONE ENCOUNTER
"Patient reports she is doing \"okay.\"  She reports having difficulty getting her clonazepam filled and states the pharmacist needs to speak with the physician.  Gave the patient the office phone number for Dr. Magana.  Patient states she understands her discharge instructions.  No further assistance needed at this time.  "

## 2021-03-30 ENCOUNTER — BULK ORDERING (OUTPATIENT)
Dept: CASE MANAGEMENT | Facility: OTHER | Age: 56
End: 2021-03-30

## 2021-03-30 DIAGNOSIS — Z23 IMMUNIZATION DUE: ICD-10-CM

## 2024-08-22 ENCOUNTER — APPOINTMENT (OUTPATIENT)
Dept: URBAN - METROPOLITAN AREA SURGERY 23 | Age: 59
Setting detail: DERMATOLOGY
End: 2024-08-22

## 2024-08-22 DIAGNOSIS — L71.8 OTHER ROSACEA: ICD-10-CM

## 2024-08-22 DIAGNOSIS — L98.8 OTHER SPECIFIED DISORDERS OF THE SKIN AND SUBCUTANEOUS TISSUE: ICD-10-CM

## 2024-08-22 PROCEDURE — OTHER MIPS QUALITY: OTHER

## 2024-08-22 PROCEDURE — OTHER PRESCRIPTION: OTHER

## 2024-08-22 PROCEDURE — OTHER COUNSELING: OTHER

## 2024-08-22 PROCEDURE — 99203 OFFICE O/P NEW LOW 30 MIN: CPT

## 2024-08-22 RX ORDER — TRETIONIN 0.25 MG/G
CREAM TOPICAL
Qty: 45 | Refills: 5 | Status: ERX | COMMUNITY
Start: 2024-08-22

## 2024-08-22 ASSESSMENT — LOCATION DETAILED DESCRIPTION DERM
LOCATION DETAILED: RIGHT CENTRAL MALAR CHEEK
LOCATION DETAILED: LEFT CENTRAL MALAR CHEEK

## 2024-08-22 ASSESSMENT — LOCATION ZONE DERM: LOCATION ZONE: FACE

## 2024-08-22 ASSESSMENT — LOCATION SIMPLE DESCRIPTION DERM
LOCATION SIMPLE: RIGHT CHEEK
LOCATION SIMPLE: LEFT CHEEK

## 2024-08-22 NOTE — PROCEDURE: MIPS QUALITY
Quality 431: Preventive Care And Screening: Unhealthy Alcohol Use - Screening: Patient not identified as an unhealthy alcohol user when screened for unhealthy alcohol use using a systematic screening method
Detail Level: Detailed
Quality 226: Preventive Care And Screening: Tobacco Use: Screening And Cessation Intervention: Patient screened for tobacco use and is an ex/non-smoker
Quality 134: Screening For Clinical Depression And Follow-Up Plan: The patient was screened for depression and the screen was negative and no follow up required

## 2024-10-17 ENCOUNTER — APPOINTMENT (OUTPATIENT)
Dept: URBAN - METROPOLITAN AREA SURGERY 23 | Age: 59
Setting detail: DERMATOLOGY
End: 2024-10-17

## 2024-10-17 DIAGNOSIS — L71.8 OTHER ROSACEA: ICD-10-CM

## 2024-10-17 PROCEDURE — OTHER PRESCRIPTION: OTHER

## 2024-10-17 PROCEDURE — OTHER MIPS QUALITY: OTHER

## 2024-10-17 PROCEDURE — 99213 OFFICE O/P EST LOW 20 MIN: CPT

## 2024-10-17 PROCEDURE — OTHER COUNSELING: OTHER

## 2024-10-17 PROCEDURE — OTHER GENTLE SKIN CARE INSTRUCTIONS: OTHER

## 2024-10-17 PROCEDURE — OTHER PRESCRIPTION MEDICATION MANAGEMENT: OTHER

## 2024-10-17 RX ORDER — METRONIDAZOLE 7.5 MG/G
CREAM TOPICAL
Qty: 45 | Refills: 6 | Status: ERX | COMMUNITY
Start: 2024-10-17

## 2024-10-17 ASSESSMENT — LOCATION DETAILED DESCRIPTION DERM: LOCATION DETAILED: LEFT CENTRAL MALAR CHEEK

## 2024-10-17 ASSESSMENT — LOCATION SIMPLE DESCRIPTION DERM: LOCATION SIMPLE: LEFT CHEEK

## 2024-10-17 ASSESSMENT — LOCATION ZONE DERM: LOCATION ZONE: FACE

## 2024-10-17 NOTE — PROCEDURE: PRESCRIPTION MEDICATION MANAGEMENT
Render In Strict Bullet Format?: No
Initiate Treatment: Metronidazole in morning
Detail Level: Zone
Plan: New RX sent to [facility]. No refills requested at current time. Apply rx as directed.
Continue Regimen: Tretinoin .025% cream

## 2024-10-17 NOTE — PROCEDURE: MIPS QUALITY
Quality 431: Preventive Care And Screening: Unhealthy Alcohol Use - Screening: Patient not identified as an unhealthy alcohol user when screened for unhealthy alcohol use using a systematic screening method
Quality 130: Documentation Of Current Medications In The Medical Record: Current Medications Documented
Quality 47: Advance Care Plan: Advance Care Planning discussed and documented; advance care plan or surrogate decision maker documented in the medical record.
Detail Level: Detailed
Quality 226: Preventive Care And Screening: Tobacco Use: Screening And Cessation Intervention: Patient screened for tobacco use and is an ex/non-smoker
Quality 134: Screening For Clinical Depression And Follow-Up Plan: The patient was screened for depression and the screen was negative and no follow up required